# Patient Record
Sex: MALE | Employment: OTHER | URBAN - METROPOLITAN AREA
[De-identification: names, ages, dates, MRNs, and addresses within clinical notes are randomized per-mention and may not be internally consistent; named-entity substitution may affect disease eponyms.]

---

## 2021-05-29 ENCOUNTER — RECORDS - HEALTHEAST (OUTPATIENT)
Dept: ADMINISTRATIVE | Facility: CLINIC | Age: 66
End: 2021-05-29

## 2023-05-26 ENCOUNTER — TRANSFERRED RECORDS (OUTPATIENT)
Dept: HEALTH INFORMATION MANAGEMENT | Facility: CLINIC | Age: 68
End: 2023-05-26
Payer: MEDICARE

## 2023-05-26 ENCOUNTER — MEDICAL CORRESPONDENCE (OUTPATIENT)
Dept: HEALTH INFORMATION MANAGEMENT | Facility: CLINIC | Age: 68
End: 2023-05-26

## 2023-05-31 ENCOUNTER — TELEPHONE (OUTPATIENT)
Dept: OPHTHALMOLOGY | Facility: CLINIC | Age: 68
End: 2023-05-31
Payer: MEDICARE

## 2023-05-31 ENCOUNTER — TRANSCRIBE ORDERS (OUTPATIENT)
Dept: OTHER | Age: 68
End: 2023-05-31

## 2023-05-31 ENCOUNTER — MEDICAL CORRESPONDENCE (OUTPATIENT)
Dept: HEALTH INFORMATION MANAGEMENT | Facility: CLINIC | Age: 68
End: 2023-05-31
Payer: MEDICARE

## 2023-05-31 DIAGNOSIS — H20.9 UVEITIC GLAUCOMA OF BOTH EYES, INDETERMINATE STAGE: Primary | ICD-10-CM

## 2023-05-31 DIAGNOSIS — H40.43X4 UVEITIC GLAUCOMA OF BOTH EYES, INDETERMINATE STAGE: Primary | ICD-10-CM

## 2023-05-31 NOTE — TELEPHONE ENCOUNTER
Spoke to pt at and reviewed consult scheduled July 18th and will likely repeat imaging/visual field at visit and discuss plan of care/treatment options.    Pt has been able to perform tests in wheelchair per pt and should be able to perform most testing if pt able to 'scoot' up in wheelchair to reach chin rest on machines.    Also placed on waiting list as pt hoping for earlier appt and will forward to faciitator to review if able to see sooner.    Note to  to mail out new patient packet per request    Jose Licea RN 11:31 AM 06/01/23            [facility] Call [facility]    Phone Message    May a detailed message be left on voicemail: yes     Reason for Call: Other: Patient has questions about his upcoming appointment we scheduled that I couldn't answer. Patient is wheelchair bound and said needs surgery and his referring Physician not able to accommodate the surgery with his current situation. He has several questions he would like to discuss before the appointment. He would also like new Packet sent out [employer] for location and time and date of his appointment.    Thank you,    Action Taken: Message routed to:  [facility] ([facility] eye    Travel Screening: Not Applicable

## 2023-06-02 ENCOUNTER — TELEPHONE (OUTPATIENT)
Dept: OPHTHALMOLOGY | Facility: CLINIC | Age: 68
End: 2023-06-02
Payer: MEDICARE

## 2023-06-02 NOTE — TELEPHONE ENCOUNTER
Called and spoke with patient. He was scheduled with Dr. Durant on 7/18, but wanted a sooner appt. Offered him 6/29 at 12:45pm. He took it. Cancelled 7/18 appt.     Paloma Escamilla, JOHANNA 12:41 PM 06/02/2023

## 2023-06-16 ENCOUNTER — NURSING HOME VISIT (OUTPATIENT)
Dept: GERIATRICS | Facility: CLINIC | Age: 68
End: 2023-06-16
Payer: MEDICARE

## 2023-06-16 DIAGNOSIS — I77.810 DILATED AORTIC ROOT (H): ICD-10-CM

## 2023-06-16 DIAGNOSIS — G35 MS (MULTIPLE SCLEROSIS) (H): Primary | ICD-10-CM

## 2023-06-16 DIAGNOSIS — K59.00 CONSTIPATION, UNSPECIFIED CONSTIPATION TYPE: ICD-10-CM

## 2023-06-16 DIAGNOSIS — M62.81 GENERALIZED MUSCLE WEAKNESS: ICD-10-CM

## 2023-06-16 DIAGNOSIS — I47.10 SVT (SUPRAVENTRICULAR TACHYCARDIA) (H): ICD-10-CM

## 2023-06-16 DIAGNOSIS — M79.2 NEUROPATHIC PAIN SYNDROME (NON-HERPETIC): ICD-10-CM

## 2023-06-16 DIAGNOSIS — R13.10 DYSPHAGIA, UNSPECIFIED TYPE: ICD-10-CM

## 2023-06-16 DIAGNOSIS — J44.9 CHRONIC OBSTRUCTIVE PULMONARY DISEASE, UNSPECIFIED COPD TYPE (H): ICD-10-CM

## 2023-06-16 PROBLEM — H40.43X4: Status: ACTIVE | Noted: 2023-06-16

## 2023-06-16 PROCEDURE — 99305 1ST NF CARE MODERATE MDM 35: CPT | Performed by: FAMILY MEDICINE

## 2023-06-16 RX ORDER — BRIMONIDINE TARTRATE AND TIMOLOL MALEATE 2; 5 MG/ML; MG/ML
1 SOLUTION OPHTHALMIC
COMMUNITY
Start: 2022-09-12

## 2023-06-16 RX ORDER — KETOROLAC TROMETHAMINE 5 MG/ML
1 SOLUTION OPHTHALMIC
COMMUNITY

## 2023-06-16 RX ORDER — IBUPROFEN 200 MG
200-400 TABLET ORAL
COMMUNITY

## 2023-06-16 RX ORDER — CYCLOBENZAPRINE HCL 10 MG
10 TABLET ORAL
COMMUNITY
Start: 2023-05-24

## 2023-06-16 RX ORDER — METOPROLOL SUCCINATE 25 MG/1
25 TABLET, EXTENDED RELEASE ORAL
COMMUNITY

## 2023-06-16 RX ORDER — LOTEPREDNOL ETABONATE 3.8 MG/G
1 GEL OPHTHALMIC
COMMUNITY
Start: 2022-09-12

## 2023-06-16 NOTE — PROGRESS NOTES
"[facility] GERIATRICS    PRIMARY CARE PROVIDER AND CLINIC:  MIKALA  LAURA, [address] / [place] [address]  No chief complaint on file.     [place] [facility] Record Number:  [id number]  Place of Service where encounter took place:  [facility] (SNF) [[id number]]    Merrill Euceda  is a 67 year old  (1955), this is regulatory nursing home admission and physical please see hospital discharge summary below  HPI:    [facility] Medicine Discharge Summary    Patient ID:  Merrill Euceda  [id number]  67 y.o.  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 *    Brief HPI Summary:     HPI from H&P:  \"Merrill Euceda is a 67 y.o. male with PMH of MS with spasticity, glaucoma secondary to bilateral eye inflammation, COPD, chronic pain, eczema, HTN who presented to the ED because his wife is currently ill and he has no one else to take care of him at home. Jake feels like things have otherwise been fairly stable at home. He has gradually progressive weakness, but no acute change. He recently received a new wheelchair and the cushion is not fitting properly, so he has had some left buttock pain. It's been bothering about a week and he swaps out a different cushion that has different pressure points to try to decrease risk of skin breakdown. PT appointment for pressure mapping is not be until September.    States that there isn't much he doesn't need help with. Sometimes needs help starting the wheelchair. Is a full feed. Has previously told that he should do pudding consistency, but does enjoy the comfort of foods and does not want to give that up. Will to do soft and bite sized for a diet here.    Struggling with " "constipation. Had an enema the last time he was here and found good benefit with that. Wonders if he would be able to have one here. Has only been taking docusate 100 mg BID and does not like to take MiraLax, partially because he worries about having diarrhea and not being able to control his bowels.\"    Please see the admission history and physical for full details.     Hospital Course, by problem:     67-year-old gentleman admitted to the hospital because his wife is admitted to the hospital, and she is his caregiver.  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   PAST SURGICAL HISTORY:   has no past surgical history on file.  FAMILY HISTORY: family history is not on file.  SOCIAL HISTORY:     Patient's living condition: lives with spouse    Post Discharge Medication Reconciliation Status:   MED REC REQUIRED  Post Medication Reconciliation Status:        Current Outpatient Medications   Medication Sig     brimonidine-timolol (COMBIGAN) 0.2-0.5 % ophthalmic solution 1 drop     cyclobenzaprine (FLEXERIL) 10 MG tablet Take 10 mg by mouth     Loteprednol Etabonate (LOTEMAX SM) 0.38 % GEL 1 " drop     naltrexone (REVIA) 1 mg/mL SOLN Take 4.5 mg by mouth     ibuprofen (ADVIL/MOTRIN) 200 MG tablet Take 200-400 mg by mouth     ketorolac (ACULAR) 0.5 % ophthalmic solution 1 drop     metoprolol succinate ER (TOPROL XL) 25 MG 24 hr tablet Take 25 mg by mouth     No current facility-administered medications for this visit.     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  Exam:  GENERAL APPEARANCE:  Alert, in no distress  ENT:  Mouth and posterior oropharynx normal, moist mucous membranes  RESP:  lungs clear to auscultation   CV:  regular rate and rhythm, no murmur, rub, or gallop  ABDOMEN:  normal bowel sounds, soft, nontender, no hepatosplenomegaly or other masses  NEURO:   Cranial nerves 2-12 are normal tested and grossly at patient's baseline    Lab/Diagnostic data:      ASSESSMENT/PLAN:    (G35) MS (multiple sclerosis) (H)  (primary encounter diagnosis)  Comment: Here for PT OT but primarily why his wife recovers so they can return home she is his caregiver  Plan:     (J44.9) Chronic obstructive pulmonary disease, unspecified COPD type (H)  Comment: Controlled  Plan:     (I77.810) Dilated aortic root (H)  Comment:   Plan:     (I47.1) SVT (supraventricular tachycardia) (H)  Comment:   Plan:     (M62.81) Generalized muscle weakness  Comment:   Plan:     (K59.00) Constipation, unspecified constipation type  Comment:   Plan:     (M79.2) Neuropathic pain syndrome (non-herpetic)  Comment:   Plan:      (R13.10) Dysphagia, unspecified type  Comment:   Plan:           Total time spent with patient visit at the skilled nursing facility was 35 min including patient visit and review of past records. Greater than 50% of total time spent with counseling and coordinating care due to admission.     Mike Polo MD on 6/16/2023 at 8:52 AM

## 2023-06-28 DIAGNOSIS — H40.43X4 GLAUCOMA SECONDARY TO EYE INFLAMMATION, BILATERAL, INDETERMINATE STAGE: Primary | ICD-10-CM

## 2023-10-02 NOTE — PROGRESS NOTES
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 This patient was referred by Dr. Young. 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   He has poor vision and elevated IOP in the left eye, and is here for consideration of tube shunt vs CPC left eye per referring providers note. He is currently on travatan at bedtime and combigan BID each eye.   He also has a history of BRVO right eye.      Today, 10/02/2023, patient states his eyes are stable. No changes in vision, no pain, no redness. Has a little irritation in the left eye occasionally.     Relevant Past Medical/Family/Social History:  Multiple Sclerosis     Relevant Review of Systems:  None are relevant     Diagnosis: Glaucoma secondary to eye inflammation  ICD-10 stages  Indeterminate - optic nerve abnormalities consistent with glaucoma are present without available visual field data.  Year diagnosis: 25+ years ago  Previous glaucoma surgery/laser:    Right eye: pseudophakia, yag cap 2003, PRP   Left eye: pseudophakia, yag cap 2003, PRP  Maximum intraocular pressure: 18/32 (10/3/23)  Current ophthalmic medications:    Right eye: Ketorolac qAM, Combigan BID, Lotemax every day,     Left eye: Ketorolac qAM, Combigan BID, Lotemax every day, Travatan at bedtime left eye  Family history of any glaucoma: negative  CCT (um) 10/3/23: 558/494  Refractive status: Axial myopia  Trauma history: negative  Steroid exposure: positive  Vasospastic disease: Migrane or Raynaud phenomenon: negative  A past hemodynamic crisis or Low BP: negative  Meds AEs/intolerance:   Baclofen   Focused PMHx:  Asthma and respiratory problems: positive - SOB, seeing pulmonology    Cardiovascular disease: positive -  SVT    Today's testing:   IOP: 14/28 mmHg by applanation  Visual field 10/3/23:   Unable to tolerate due to M.S.  OCT Optic Nerve RNFL Spectralis 10/3/23  Right eye: Thinning inferior, nasal, and superior   Left eye: 360 thinning     Additional Ocular History:   2. BRVO right eye     3. Pseudophakia each eye   S/p Yag Capsulotomy each eye     4. History of Uveitis each eye     Plan/Recommendations:  Discussed findings with patient.  IOP elevated left eye on maximal tolerated drops, recommend shunt surgery given difficulty with positioning I recommend avoiding MIGS procedures. Will likely need general anesthesia due to postioning difficulty. High risk for postop inflammation and hypotony due to history of uveitis, recommended patient gets appointment with uveitis specialist prior to surgery but patient can not make it to appointment. Will plan subconj steroids at time of surgery and possible oral steroids after surgery.   Risks and benefits of Ahmed tube shunt surgery left eye, including risks of bleeding, infection, need for additional surgery, failure of surgery, and vision loss were explained to patient, who expressed understanding and wishes to proceed with surgery  Continue Ketorolac qAM each eye  Continue Combigan BID each eye  Continue Lotemax every day each eye   Continue Travatan at bedtime left eye    Schedule Ahmed shunt left eye, GA, difficulty with positioning due to MS     Physician Attestation     Attending Physician Attestation:  Complete documentation of historical and exam elements from today's encounter can be found in the full encounter summary report (not reduplicated in this progress note). I personally obtained the chief complaint(s) and history of present illness. I confirmed and edited as necessary the review of systems, past medical/surgical history, family history, social history, and examination findings as documented by others; and I examined the patient myself. I personally reviewed the  relevant tests, images, and reports as documented above. I personally reviewed the ophthalmic test(s) associated with this encounter, agree with the interpretation(s) as documented by the resident/fellow and have edited the corresponding report(s) as necessary. I formulated and edited as necessary the assessment and plan and discussed the findings and management plan with the patient and any family members present at the time of the visit.  Jarett Durant M.D., Glaucoma, 10/3/23     My privilege to be part of your care,      Kadeem Armstrong M.D.  Resident Physician, PGY-2  Department of Ophthalmology

## 2023-10-03 ENCOUNTER — PREP FOR PROCEDURE (OUTPATIENT)
Dept: OPHTHALMOLOGY | Facility: CLINIC | Age: 68
End: 2023-10-03
Payer: MEDICARE

## 2023-10-03 ENCOUNTER — OFFICE VISIT (OUTPATIENT)
Dept: OPHTHALMOLOGY | Facility: CLINIC | Age: 68
End: 2023-10-03
Attending: OPHTHALMOLOGY
Payer: MEDICARE

## 2023-10-03 DIAGNOSIS — H40.43X4 GLAUCOMA SECONDARY TO EYE INFLAMMATION, BILATERAL, INDETERMINATE STAGE: Primary | ICD-10-CM

## 2023-10-03 DIAGNOSIS — H40.42X3 UVEITIC GLAUCOMA OF LEFT EYE, SEVERE STAGE: Primary | ICD-10-CM

## 2023-10-03 DIAGNOSIS — H20.9 UVEITIC GLAUCOMA OF LEFT EYE, SEVERE STAGE: Primary | ICD-10-CM

## 2023-10-03 DIAGNOSIS — H40.43X4 GLAUCOMA SECONDARY TO EYE INFLAMMATION, BILATERAL, INDETERMINATE STAGE: ICD-10-CM

## 2023-10-03 PROCEDURE — G0463 HOSPITAL OUTPT CLINIC VISIT: HCPCS | Performed by: OPHTHALMOLOGY

## 2023-10-03 PROCEDURE — 76514 ECHO EXAM OF EYE THICKNESS: CPT | Performed by: OPHTHALMOLOGY

## 2023-10-03 PROCEDURE — 99214 OFFICE O/P EST MOD 30 MIN: CPT | Mod: GC | Performed by: OPHTHALMOLOGY

## 2023-10-03 PROCEDURE — 92133 CPTRZD OPH DX IMG PST SGM ON: CPT | Performed by: OPHTHALMOLOGY

## 2023-10-03 RX ORDER — PROPARACAINE HYDROCHLORIDE 5 MG/ML
1 SOLUTION/ DROPS OPHTHALMIC ONCE
Status: CANCELLED | OUTPATIENT
Start: 2023-10-03 | End: 2023-10-03

## 2023-10-03 RX ORDER — TRAVOPROST OPHTHALMIC SOLUTION 0.04 MG/ML
1 SOLUTION OPHTHALMIC AT BEDTIME
COMMUNITY
Start: 2023-07-24

## 2023-10-03 ASSESSMENT — SLIT LAMP EXAM - LIDS
COMMENTS: NORMAL
COMMENTS: NORMAL

## 2023-10-03 ASSESSMENT — CONF VISUAL FIELD
OS_INFERIOR_TEMPORAL_RESTRICTION: 1
OS_INFERIOR_NASAL_RESTRICTION: 1
METHOD: COUNTING FINGERS
OD_NORMAL: 1
OD_INFERIOR_NASAL_RESTRICTION: 0
OS_SUPERIOR_TEMPORAL_RESTRICTION: 2
OD_SUPERIOR_TEMPORAL_RESTRICTION: 0
OD_INFERIOR_TEMPORAL_RESTRICTION: 0
OS_SUPERIOR_NASAL_RESTRICTION: 2
OD_SUPERIOR_NASAL_RESTRICTION: 0

## 2023-10-03 ASSESSMENT — VISUAL ACUITY
METHOD: SNELLEN - LINEAR
OD_CC+: -2
OD_CC: 20/40
OD_PH_CC+: -2
OD_PH_CC: 20/30
OS_CC: CF 3'

## 2023-10-03 ASSESSMENT — PACHYMETRY
OD_CT(UM): 558
OS_CT(UM): 494

## 2023-10-03 ASSESSMENT — TONOMETRY
OD_IOP_MMHG: 18
OD_IOP_MMHG: 14
IOP_METHOD: TONOPEN
IOP_METHOD: APPLANATION
OS_IOP_MMHG: 32
OS_IOP_MMHG: 32
IOP_METHOD: TONOPEN
OS_IOP_MMHG: 28

## 2023-10-03 ASSESSMENT — REFRACTION_WEARINGRX
OD_CYLINDER: +0.75
OD_AXIS: 038
OS_AXIS: 028
OS_CYLINDER: +0.50
OD_SPHERE: -3.25
OS_SPHERE: -2.00

## 2023-10-03 ASSESSMENT — EXTERNAL EXAM - RIGHT EYE: OD_EXAM: NORMAL

## 2023-10-03 ASSESSMENT — CUP TO DISC RATIO
OS_RATIO: 0.8
OD_RATIO: 0.8

## 2023-10-03 ASSESSMENT — EXTERNAL EXAM - LEFT EYE: OS_EXAM: NORMAL

## 2023-10-03 NOTE — NURSING NOTE
Chief Complaints and History of Present Illnesses   Patient presents with    Glaucoma Evaluation     Chief Complaint(s) and History of Present Illness(es)       Glaucoma Evaluation              Laterality: both eyes    Associated symptoms: Negative for dryness, eye pain, tearing, flashes and floaters    Pain scale: 0/10              Comments    Merrill is here new to clinic referred by Dr Young for evaluation of glaucoma. Merrill states his eyes feels irritated all the time over the past 2-3 months.  He says vision is poor and has been for the past couple of years.     Rayray Arroyo COT 1:25 PM October 3, 2023

## 2023-10-09 ENCOUNTER — PREP FOR PROCEDURE (OUTPATIENT)
Dept: OPHTHALMOLOGY | Facility: CLINIC | Age: 68
End: 2023-10-09
Payer: MEDICARE

## 2023-10-12 ENCOUNTER — TELEPHONE (OUTPATIENT)
Dept: OPHTHALMOLOGY | Facility: CLINIC | Age: 68
End: 2023-10-12
Payer: MEDICARE

## 2023-10-12 NOTE — TELEPHONE ENCOUNTER
Called patient to schedule surgery with Dr. Durant, patient stated he wants to hold of on scheduling and will reach out to the clinic when he is ready and able.    Anna C. Schoenecker on 10/12/2023 at 10:10 AM